# Patient Record
Sex: FEMALE | Race: BLACK OR AFRICAN AMERICAN | Employment: UNEMPLOYED | URBAN - METROPOLITAN AREA
[De-identification: names, ages, dates, MRNs, and addresses within clinical notes are randomized per-mention and may not be internally consistent; named-entity substitution may affect disease eponyms.]

---

## 2020-11-02 ENCOUNTER — HOSPITAL ENCOUNTER (EMERGENCY)
Age: 35
Discharge: HOME | End: 2020-11-02
Attending: FAMILY MEDICINE
Payer: MEDICAID

## 2020-11-02 VITALS
HEART RATE: 82 BPM | TEMPERATURE: 99.1 F | DIASTOLIC BLOOD PRESSURE: 86 MMHG | BODY MASS INDEX: 39.24 KG/M2 | RESPIRATION RATE: 18 BRPM | WEIGHT: 250 LBS | OXYGEN SATURATION: 100 % | HEIGHT: 67 IN | SYSTOLIC BLOOD PRESSURE: 139 MMHG

## 2020-11-02 DIAGNOSIS — R05.9 COUGH: Primary | ICD-10-CM

## 2020-11-02 PROCEDURE — 99281 EMR DPT VST MAYX REQ PHY/QHP: CPT

## 2020-11-02 RX ORDER — CETIRIZINE HYDROCHLORIDE 5 MG/1
10 TABLET ORAL DAILY
Qty: 60 TAB | Refills: 0 | Status: SHIPPED | OUTPATIENT
Start: 2020-11-02 | End: 2020-12-02

## 2020-11-02 RX ORDER — BENZONATATE 100 MG/1
100 CAPSULE ORAL
Qty: 20 CAP | Refills: 0 | Status: SHIPPED | OUTPATIENT
Start: 2020-11-02 | End: 2020-11-09

## 2020-11-02 NOTE — ED PROVIDER NOTES
HPI   Chief Complaint   Patient presents with    Cough   35yoF presents with cough. 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 She denies fever, rhinorrhea, sore throat, headache, myalgia, n/v/d, abdominal pain, known sick contacts. Has not had flu shot this year. Works at [employer] where she and everyone else practices good covid-19 precautions, and no worker has fallen sick from [facility]. Pt does smoke daily and notices whenever weather gets cold she begins to have regular dry coughs. Past Medical History:   Diagnosis Date    Anxiety     Depression      History reviewed. No pertinent surgical history.       Family History:   Problem Relation Age of Onset    Diabetes Mother     Hypertension Father    Ángel Donohue Elevated Lipids Father     Mental Retardation Brother        Social History     Socioeconomic History    Marital status:      Spouse name: Not on file    Number of children: Not on file    Years of education: Not on file    Highest education level: Not on file   Occupational History    Not on file   Social Needs    Financial resource strain: Not on file    Food insecurity     Worry: Not on file     Inability: Not on file    Transportation needs     Medical: Not on file     Non-medical: Not on file   Tobacco Use    Smoking status: Current Every Day Smoker     Packs/day: 0.50     Last attempt to quit: 10/22/2009     Years since quittin.0    Smokeless tobacco: Never Used   Substance and Sexual Activity    Alcohol use: No    Drug use: Yes     Types: Marijuana     Comment: occasional use    Sexual activity: Yes     Partners: Male     Birth control/protection: Condom, Pill   Lifestyle    Physical activity     Days per week: Not on file     Minutes per session: Not on file    Stress: Not on file   Relationships    Social connections     Talks on phone: Not on file Gets together: Not on file     Attends Jainism service: Not on file     Active member of club or organization: Not on file     Attends meetings of clubs or organizations: Not on file     Relationship status: Not on file    Intimate partner violence     Fear of current or ex partner: Not on file     Emotionally abused: Not on file     Physically abused: Not on file     Forced sexual activity: Not on file   Other Topics Concern    Not on file   Social History Narrative    Not on file         ALLERGIES: Latex, natural rubber; Shellfish containing products; and Tramadol    Review of Systems   Respiratory: Positive for cough, chest tightness, shortness of breath and wheezing. All other systems reviewed and are negative. Vitals:    11/02/20 0640   BP: 139/86   Pulse: 82   Resp: 18   Temp: 99.1 °F (37.3 °C)   SpO2: 100%   Weight: 113.4 kg (250 lb)   Height: 5' 7\" (1.702 m)          Physical Exam   Patient Vitals for the past 8 hrs:   Temp Pulse Resp BP SpO2   11/02/20 0640 99.1 °F (37.3 °C) 82 18 139/86 100 %        Nursing note and vitals reviewed. Constitutional: NAD. Head: Normocephalic and atraumatic. Mouth/Throat: Airway patent. Moist mucous membranes. Eyes: EOMI. No scleral icterus. Neck: Neck supple. Cardiovascular: Normal rate, regular rhythm. Normal heart sounds. No murmur, rub, or gallop. Good pulses throughout. Pulmonary/Chest: No respiratory distress. Not coughing. Clear to auscultation bilaterally. No wheezes, rales, or rhonchi. Abdominal/GI: BS normal, Soft, non-tender, non-distended. No rebound or guarding. Musculoskeletal: No gross injuries or deformities. No leg edema. Neurological: Alert and oriented to person, place, and time. Moving all extremities. No gross deficits. Psych: Pleasant, cooperative. Skin: Skin is warm and dry. No rash noted. MDM   Ddx = seasonal allergy, smoker's cough, less likely flu/covid-19/other viral infection, less likely pneumonia. PERC 0.   I briefly discussed flu vaccination and smoking cessation with patient. Rx cetirizine and tessalon. Given return precautions. Dajuan Smith MD, am  the first and primary ED provider for this patient.         Procedures

## 2020-11-02 NOTE — DISCHARGE INSTRUCTIONS
See PCP or return to ED if having infectious symptoms and requiring testing for flu and covid-19. Return to ED for trouble breathing. Cutting back and quitting cigarettes will also help.

## 2021-12-02 ENCOUNTER — HOSPITAL ENCOUNTER (EMERGENCY)
Age: 36
Discharge: HOME | End: 2021-12-02
Payer: MEDICAID

## 2021-12-02 ENCOUNTER — APPOINTMENT (OUTPATIENT)
Dept: CT IMAGING | Age: 36
End: 2021-12-02
Attending: EMERGENCY MEDICINE
Payer: MEDICAID

## 2021-12-02 VITALS
TEMPERATURE: 98 F | RESPIRATION RATE: 16 BRPM | DIASTOLIC BLOOD PRESSURE: 73 MMHG | HEIGHT: 67 IN | SYSTOLIC BLOOD PRESSURE: 121 MMHG | OXYGEN SATURATION: 99 % | BODY MASS INDEX: 37.67 KG/M2 | WEIGHT: 240 LBS | HEART RATE: 63 BPM

## 2021-12-02 DIAGNOSIS — R51.9 NONINTRACTABLE EPISODIC HEADACHE, UNSPECIFIED HEADACHE TYPE: Primary | ICD-10-CM

## 2021-12-02 PROCEDURE — 99283 EMERGENCY DEPT VISIT LOW MDM: CPT

## 2021-12-02 PROCEDURE — 96372 THER/PROPH/DIAG INJ SC/IM: CPT

## 2021-12-02 PROCEDURE — 74011636637 HC RX REV CODE- 636/637: Performed by: NURSE PRACTITIONER

## 2021-12-02 PROCEDURE — 70450 CT HEAD/BRAIN W/O DYE: CPT

## 2021-12-02 RX ORDER — SUMATRIPTAN 25 MG/1
25-100 TABLET, FILM COATED ORAL
Qty: 20 TABLET | Refills: 0 | Status: SHIPPED | OUTPATIENT
Start: 2021-12-02 | End: 2021-12-02

## 2021-12-02 RX ORDER — SUMATRIPTAN 6 MG/.5ML
6 INJECTION, SOLUTION SUBCUTANEOUS
Status: COMPLETED | OUTPATIENT
Start: 2021-12-02 | End: 2021-12-02

## 2021-12-02 RX ADMIN — SUMATRIPTAN 6 MG: 6 INJECTION, SOLUTION SUBCUTANEOUS at 19:44

## 2021-12-02 NOTE — ED NOTES
Pt presented to the ER with complaint of headaches. Pt stated she has been having headaches for about one year. Pt stated she has not seen anybody about headaches. Pt stated over the last couple of months headaches have been getting worse. Pt stated some of the headaches she has had nausea, sensistivity to light. Pt stated she has had some blurred vision. oriented to room and call bell,, cardiac monitoring initiated , spO2 Monitoring initiated , IV  initiated , call bell within reach, side rails up x2, resting comfortable but easily arousable, no signs of acute distress. , bed in lowest position, will continue to monitor      Airway: Patent, Managing oral secretions and No obstruction    Respiratory: Normal Rate , Normal Depth, No acute Distress and Speaking in full sentences    Cardiac: WNL    Neuro: AVPU alert and A&O4/4    GI: Soft and Non-tender    : WNL    Muscle/Skeletal/Skin: WNL

## 2021-12-02 NOTE — ED TRIAGE NOTES
Reports severe headache going on for \"awhile\" Reports taking meds with minimal improvement. Reports nausea and vomiting related to headache. No hx of migraine. Reports worse headache of life with some vision changes.

## 2021-12-02 NOTE — Clinical Note
[address] [facility] EMERGENCY DEPT  [address] [place] [id number]  [phone]    Work/School Note    Date: 12/2/2021    To Whom It May concern:    Lauren Ervin was seen and treated today in the emergency room by the following provider(s):  Nurse Practitioner: Iqra Ríos NP. Lauren Ervin is excused from work/school on 12/2/2021 through 12/4/2021. She is medically clear to return to work/school on 12/5/2021.          Sincerely,          Jackson Mcadams NP [facility]

## 2021-12-03 NOTE — DISCHARGE INSTRUCTIONS
Thank you! Thank you for allowing me to care for you in the emergency department. I sincerely hope that you are satisfied with your visit today. It is my goal to provide you with excellent care. Below you will find a list of your labs and imaging from your visit today. Should you have any questions regarding these results please do not hesitate to call the emergency department. Labs -   No results found for this or any previous visit (from the past 12 hour(s)). Radiologic Studies -   CT HEAD WO CONT   Final Result   Normal noncontrast head CT. CT Results  (Last 48 hours)                 12/02/21 1752  CT HEAD WO CONT Final result    Impression:  Normal noncontrast head CT. Narrative:  CT HEAD WITHOUT IV CONTRAST       CLINICAL INDICATION: Headache       TECHNIQUE: Routine axial images were obtained through the brain without the use   of IV contrast. Sagittal and coronal reformatted images were performed at the CT   console. Dose reduction: Per department policy, all CT scans at this facility   are performed using dose reduction optimization techniques as appropriate to a   performed examination including the following: Automated exposure control,   adjustments of the mA and/or KV according to patient size, or use of iterative   reconstruction technique. COMPARISON: None. FINDINGS:        No evidence of acute intracranial hemorrhage or mass effect. Brain parenchymal attenuation is unremarkable for patient age. Ventricles and extra-axial spaces are normal in size and configuration for age. Portions of the posterior fossa not obscured by streak artifact are   unremarkable. Globes and orbits are normal in CT appearance. Paranasal sinuses are predominantly clear. Tympanomastoid cavities are clear. No acute calvarial abnormality. Visualized major intracranial vasculature is unremarkable in noncontrast CT   appearance.                  CXR Results  (Last 48 hours) None               If you feel that you have not received excellent quality care or timely care, please ask to speak to the nurse manager. Please choose us in the future for your continued health care needs. ------------------------------------------------------------------------------------------------------------  The exam and treatment you received in the Emergency Department were for an urgent problem and are not intended as complete care. It is important that you follow-up with a doctor, nurse practitioner, or physician assistant to:  (1) confirm your diagnosis,  (2) re-evaluation of changes in your illness and treatment, and  (3) for ongoing care. If your symptoms become worse or you do not improve as expected and you are unable to reach your usual health care provider, you should return to the Emergency Department. We are available 24 hours a day. Please take your discharge instructions with you when you go to your follow-up appointment. If you have any problem arranging a follow-up appointment, contact the Emergency Department immediately. If a prescription has been provided, please have it filled as soon as possible to prevent a delay in treatment. Read the entire medication instruction sheet provided to you by the pharmacy. If you have any questions or reservations about taking the medication due to side effects or interactions with other medications, please call your primary care physician or contact the ER to speak with the charge nurse. Make an appointment with your family doctor or the physician you were referred to for follow-up of this visit as instructed on your discharge paperwork, as this is a mandatory follow-up. Return to the ER if you are unable to be seen or if you are unable to be seen in a timely manner. If you have any problem arranging the follow-up visit, contact the Emergency Department immediately. no

## 2021-12-06 NOTE — ED PROVIDER NOTES
EMERGENCY DEPARTMENT HISTORY AND PHYSICAL EXAM      Date: 2021  Patient Name: Paula Knowles    History of Presenting Illness     Chief Complaint   Patient presents with    Headache       History Provided By: Patient    HPI: Paula Knowles, 31 y.o. female with a past medical history significant anxiety presents to the ED with cc of headache. Patient reports chronic headaches x1 year. She states headaches have become more persistent over the last few weeks. She denies any associated visual changes, photophobia, dizziness or N/V. She states she typically takes ibuprofen and rest with resolution, tonight her symptoms did not resolve. She states she is mostly worried because her son had similar symptoms and found to have a brain tumor. There are no other complaints, changes, or physical findings at this time. PCP: None    No current facility-administered medications on file prior to encounter. No current outpatient medications on file prior to encounter. Past History     Past Medical History:  Past Medical History:   Diagnosis Date    Anxiety     Depression        Past Surgical History:  No past surgical history on file. Family History:  Family History   Problem Relation Age of Onset    Diabetes Mother     Hypertension Father     Elevated Lipids Father     Mental Retardation Brother        Social History:  Social History     Tobacco Use    Smoking status: Current Every Day Smoker     Packs/day: 0.50     Last attempt to quit: 10/22/2009     Years since quittin.1    Smokeless tobacco: Never Used   Substance Use Topics    Alcohol use: No    Drug use: Yes     Types: Marijuana     Comment: occasional use       Allergies: Allergies   Allergen Reactions    Latex, Natural Rubber Rash and Swelling    Shellfish Containing Products Itching    Tramadol Hives         Review of Systems     Review of Systems   HENT: Negative. Eyes: Negative for photophobia and visual disturbance. Gastrointestinal: Negative. Negative for nausea and vomiting. Neurological: Positive for headaches. Negative for dizziness. All other systems reviewed and are negative. Physical Exam     Physical Exam  Vitals and nursing note reviewed. Constitutional:       General: She is not in acute distress. Appearance: Normal appearance. She is obese. She is not toxic-appearing. HENT:      Head: Normocephalic and atraumatic. Right Ear: Tympanic membrane normal.      Left Ear: Tympanic membrane normal.      Nose: Nose normal.      Mouth/Throat:      Mouth: Mucous membranes are moist.   Eyes:      Extraocular Movements: Extraocular movements intact. Conjunctiva/sclera: Conjunctivae normal.      Pupils: Pupils are equal, round, and reactive to light. Cardiovascular:      Rate and Rhythm: Normal rate and regular rhythm. Heart sounds: Normal heart sounds. Pulmonary:      Effort: Pulmonary effort is normal.      Breath sounds: Normal breath sounds. No wheezing or rales. Musculoskeletal:         General: Normal range of motion. Skin:     General: Skin is warm and dry. Neurological:      General: No focal deficit present. Mental Status: She is alert. GCS: GCS eye subscore is 4. GCS verbal subscore is 5. GCS motor subscore is 6. Cranial Nerves: Cranial nerves are intact. Sensory: Sensation is intact. Motor: Motor function is intact. Coordination: Coordination is intact. Gait: Gait is intact. Psychiatric:         Mood and Affect: Mood normal.         Behavior: Behavior normal. Behavior is cooperative. Lab and Diagnostic Study Results     Labs -   No results found for this or any previous visit (from the past 12 hour(s)).     Radiologic Studies -   CT Results (most recent):  Results from Hospital Encounter encounter on 12/02/21    CT HEAD WO CONT    Narrative  CT HEAD WITHOUT IV CONTRAST    CLINICAL INDICATION: Headache    TECHNIQUE: Routine axial images were obtained through the brain without the use  of IV contrast. Sagittal and coronal reformatted images were performed at the CT  console. Dose reduction: Per department policy, all CT scans at this facility  are performed using dose reduction optimization techniques as appropriate to a  performed examination including the following: Automated exposure control,  adjustments of the mA and/or KV according to patient size, or use of iterative  reconstruction technique. COMPARISON: None. FINDINGS:    No evidence of acute intracranial hemorrhage or mass effect. Brain parenchymal attenuation is unremarkable for patient age. Ventricles and extra-axial spaces are normal in size and configuration for age. Portions of the posterior fossa not obscured by streak artifact are  unremarkable. Globes and orbits are normal in CT appearance. Paranasal sinuses are predominantly clear. Tympanomastoid cavities are clear. No acute calvarial abnormality. Visualized major intracranial vasculature is unremarkable in noncontrast CT  appearance. Impression  Normal noncontrast head CT. Medical Decision Making   - I am the first provider for this patient. - I reviewed the vital signs, available nursing notes, past medical history, past surgical history, family history and social history. - Initial assessment performed. The patients presenting problems have been discussed, and they are in agreement with the care plan formulated and outlined with them. I have encouraged them to ask questions as they arise throughout their visit. Vital Signs-Reviewed the patient's vital signs.   See chart    Records Reviewed: Nursing Notes    The patient presents with headache with a differential diagnosis of  ICH, migraine, sinusitis and tension headache      ED Course:     ED Course as of 12/06/21 0502   Thu Dec 02, 2021   2110 Pt states she is feeling better and ready to  go home [LP]   6790 Pt states HA has completely subsided [LP]      ED Course User Index  [LP] Rob Gonzalez NP       Provider Notes (Medical Decision Making):   Patient presents with acute on chronic aches, neuro benign. Vitals are normal.  Head CT negative. Patient was treated with subcu Imitrex with improvement of symptoms. Will discharge with Rx Imitrex as needed with PCP/neurology follow-up for persistent symptoms. Reinforced importance of adequate hydration and rest.  Red flags and return precautions reviewed  MDM  Number of Diagnoses or Management Options  Nonintractable episodic headache, unspecified headache type: established, worsening     Amount and/or Complexity of Data Reviewed  Tests in the radiology section of CPT®: ordered and reviewed    Risk of Complications, Morbidity, and/or Mortality  Presenting problems: low  Diagnostic procedures: low  Management options: low    Patient Progress  Patient progress: improved         Disposition   Disposition: Condition stable and improved  DC- Adult Discharges: All of the diagnostic tests were reviewed and questions answered. Diagnosis, care plan and treatment options were discussed. The patient understands the instructions and will follow up as directed. The patients results have been reviewed with them. They have been counseled regarding their diagnosis. The patient verbally convey understanding and agreement of the signs, symptoms, diagnosis, treatment and prognosis and additionally agrees to follow up as recommended with their PCP in 24 - 48 hours. They also agree with the care-plan and convey that all of their questions have been answered. I have also put together some discharge instructions for them that include: 1) educational information regarding their diagnosis, 2) how to care for their diagnosis at home, as well a 3) list of reasons why they would want to return to the ED prior to their follow-up appointment, should their condition change.   DC-The patient was given verbal follow-up and headache warning signs and instructions  DC- Pain Control DC Home plan: Nonsteroidals, Tylenol and Referral Family Medicine/PCP and neurology    Discharged    DISCHARGE PLAN:  1. Cannot display discharge medications since this patient is not currently admitted. 2.   Follow-up Information     Follow up With Specialties Details [facility] [address]  Schedule an appointment as soon as possible for a visit  OR your PCP, for ER follow up [address]    [address] [facility] Neurology Schedule an appointment as soon as possible for a visit in 1 day for persistent headaches follow up [facility] [address]  [phone]        3. Return to ED if worse   4. Discharge Medication List as of 12/2/2021  9:59 PM      imitrex 25mg PO      Diagnosis     Clinical Impression:   1. Nonintractable episodic headache, unspecified headache type        Attestations:    Duke Weber NP    Please note that this dictation was completed with [employer], the computer voice recognition software. Quite often unanticipated grammatical, syntax, homophones, and other interpretive errors are inadvertently transcribed by the computer software. Please disregard these errors. Please excuse any errors that have escaped final proofreading. Thank you.

## 2022-03-03 ENCOUNTER — HOSPITAL ENCOUNTER (EMERGENCY)
Age: 37
Discharge: HOME | End: 2022-03-03
Payer: MEDICAID

## 2022-03-03 ENCOUNTER — APPOINTMENT (OUTPATIENT)
Dept: GENERAL RADIOLOGY | Age: 37
End: 2022-03-03
Attending: STUDENT IN AN ORGANIZED HEALTH CARE EDUCATION/TRAINING PROGRAM
Payer: MEDICAID

## 2022-03-03 VITALS
DIASTOLIC BLOOD PRESSURE: 61 MMHG | BODY MASS INDEX: 37.98 KG/M2 | WEIGHT: 242 LBS | HEIGHT: 67 IN | TEMPERATURE: 98.3 F | RESPIRATION RATE: 16 BRPM | HEART RATE: 88 BPM | OXYGEN SATURATION: 100 % | SYSTOLIC BLOOD PRESSURE: 142 MMHG

## 2022-03-03 DIAGNOSIS — K29.90 GASTRITIS AND DUODENITIS: Primary | ICD-10-CM

## 2022-03-03 LAB
ALBUMIN SERPL-MCNC: 3.3 G/DL (ref 3.5–5)
ALBUMIN/GLOB SERPL: 0.7 {RATIO} (ref 1.1–2.2)
ALP SERPL-CCNC: 65 U/L (ref 45–117)
ALT SERPL-CCNC: 32 U/L (ref 12–78)
ANION GAP SERPL CALC-SCNC: 4 MMOL/L (ref 5–15)
AST SERPL W P-5'-P-CCNC: 24 U/L (ref 15–37)
BASOPHILS # BLD: 0 K/UL (ref 0–0.1)
BASOPHILS NFR BLD: 0 % (ref 0–1)
BILIRUB SERPL-MCNC: 0.2 MG/DL (ref 0.2–1)
BUN SERPL-MCNC: 12 MG/DL (ref 6–20)
BUN/CREAT SERPL: 16 (ref 12–20)
CA-I BLD-MCNC: 8.8 MG/DL (ref 8.5–10.1)
CHLORIDE SERPL-SCNC: 109 MMOL/L (ref 97–108)
CO2 SERPL-SCNC: 26 MMOL/L (ref 21–32)
CREAT SERPL-MCNC: 0.74 MG/DL (ref 0.55–1.02)
DIFFERENTIAL METHOD BLD: ABNORMAL
EOSINOPHIL # BLD: 0.2 K/UL (ref 0–0.4)
EOSINOPHIL NFR BLD: 1 % (ref 0–7)
ERYTHROCYTE [DISTWIDTH] IN BLOOD BY AUTOMATED COUNT: 13.1 % (ref 11.5–14.5)
GLOBULIN SER CALC-MCNC: 4.5 G/DL (ref 2–4)
GLUCOSE SERPL-MCNC: 92 MG/DL (ref 65–100)
HCT VFR BLD AUTO: 39.9 % (ref 35–47)
HGB BLD-MCNC: 13.2 G/DL (ref 11.5–16)
IMM GRANULOCYTES # BLD AUTO: 0 K/UL (ref 0–0.04)
IMM GRANULOCYTES NFR BLD AUTO: 0 % (ref 0–0.5)
LYMPHOCYTES # BLD: 3.3 K/UL (ref 0.8–3.5)
LYMPHOCYTES NFR BLD: 27 % (ref 12–49)
MCH RBC QN AUTO: 28.3 PG (ref 26–34)
MCHC RBC AUTO-ENTMCNC: 33.1 G/DL (ref 30–36.5)
MCV RBC AUTO: 85.4 FL (ref 80–99)
MONOCYTES # BLD: 0.8 K/UL (ref 0–1)
MONOCYTES NFR BLD: 7 % (ref 5–13)
NEUTS SEG # BLD: 7.9 K/UL (ref 1.8–8)
NEUTS SEG NFR BLD: 65 % (ref 32–75)
NRBC # BLD: 0 K/UL (ref 0–0.01)
NRBC BLD-RTO: 0 PER 100 WBC
PLATELET # BLD AUTO: 262 K/UL (ref 150–400)
PMV BLD AUTO: 9.7 FL (ref 8.9–12.9)
POTASSIUM SERPL-SCNC: 4.2 MMOL/L (ref 3.5–5.1)
PROT SERPL-MCNC: 7.8 G/DL (ref 6.4–8.2)
RBC # BLD AUTO: 4.67 M/UL (ref 3.8–5.2)
SODIUM SERPL-SCNC: 139 MMOL/L (ref 136–145)
TROPONIN-HIGH SENSITIVITY: 4 NG/L (ref 0–51)
WBC # BLD AUTO: 12.2 K/UL (ref 3.6–11)

## 2022-03-03 PROCEDURE — 85025 COMPLETE CBC W/AUTO DIFF WBC: CPT

## 2022-03-03 PROCEDURE — 74011250637 HC RX REV CODE- 250/637: Performed by: PHYSICIAN ASSISTANT

## 2022-03-03 PROCEDURE — 84484 ASSAY OF TROPONIN QUANT: CPT

## 2022-03-03 PROCEDURE — 71045 X-RAY EXAM CHEST 1 VIEW: CPT

## 2022-03-03 PROCEDURE — 80053 COMPREHEN METABOLIC PANEL: CPT

## 2022-03-03 PROCEDURE — 74011000250 HC RX REV CODE- 250: Performed by: PHYSICIAN ASSISTANT

## 2022-03-03 PROCEDURE — 93005 ELECTROCARDIOGRAM TRACING: CPT

## 2022-03-03 PROCEDURE — 99285 EMERGENCY DEPT VISIT HI MDM: CPT

## 2022-03-03 PROCEDURE — 96374 THER/PROPH/DIAG INJ IV PUSH: CPT

## 2022-03-03 PROCEDURE — 36415 COLL VENOUS BLD VENIPUNCTURE: CPT

## 2022-03-03 PROCEDURE — 74011250636 HC RX REV CODE- 250/636: Performed by: PHYSICIAN ASSISTANT

## 2022-03-03 RX ORDER — FAMOTIDINE 20 MG/1
20 TABLET, FILM COATED ORAL 2 TIMES DAILY
Qty: 20 TABLET | Refills: 0 | Status: SHIPPED | OUTPATIENT
Start: 2022-03-03 | End: 2022-03-13

## 2022-03-03 RX ORDER — LIDOCAINE HYDROCHLORIDE 20 MG/ML
15 SOLUTION OROPHARYNGEAL
Status: COMPLETED | OUTPATIENT
Start: 2022-03-03 | End: 2022-03-03

## 2022-03-03 RX ORDER — MAG HYDROX/ALUMINUM HYD/SIMETH 200-200-20
30 SUSPENSION, ORAL (FINAL DOSE FORM) ORAL ONCE
Status: COMPLETED | OUTPATIENT
Start: 2022-03-03 | End: 2022-03-03

## 2022-03-03 RX ADMIN — ALUMINUM HYDROXIDE, MAGNESIUM HYDROXIDE, AND SIMETHICONE 30 ML: 200; 200; 20 SUSPENSION ORAL at 22:21

## 2022-03-03 RX ADMIN — SODIUM CHLORIDE, PRESERVATIVE FREE 20 MG: 5 INJECTION INTRAVENOUS at 22:22

## 2022-03-03 RX ADMIN — LIDOCAINE HYDROCHLORIDE 15 ML: 20 SOLUTION ORAL; TOPICAL at 22:21

## 2022-03-03 NOTE — Clinical Note
[facility] EMERGENCY DEPT  [place] [id number]  [phone]    Work/School Note    Date: 3/3/2022    To Whom It May concern:    Shelbi Horner was seen and treated today in the emergency room by the following provider(s):  Physician Assistant: Bessie Giordano PA-C. Shelbi Horner is excused from work/school on 03/03/22 and 03/04/22. She is medically clear to return to work/school on 3/5/2022.        Sincerely,          Trace Cleveland PA-C

## 2022-03-04 LAB
ATRIAL RATE: 82 BPM
CALCULATED P AXIS, ECG09: 59 DEGREES
CALCULATED R AXIS, ECG10: 39 DEGREES
CALCULATED T AXIS, ECG11: 31 DEGREES
DIAGNOSIS, 93000: NORMAL
P-R INTERVAL, ECG05: 164 MS
Q-T INTERVAL, ECG07: 362 MS
QRS DURATION, ECG06: 82 MS
QTC CALCULATION (BEZET), ECG08: 422 MS
VENTRICULAR RATE, ECG03: 82 BPM

## 2022-03-04 NOTE — ED PROVIDER NOTES
EMERGENCY DEPARTMENT HISTORY AND PHYSICAL EXAM      Date: 3/3/2022  Patient Name: Patricia Goode    History of Presenting Illness     Chief Complaint   Patient presents with    Chest Pain       History Provided By: Patient    HPI: Patricia Goode, 30 y.o. female with a past medical history significant for anxiety and depression who presents to the ED with cc of epigastric abdominal pain. Pt reports a one day history of \"burning\" epigastric pain radiating into her chest as a \"tightness\". Pt states that her sx's are consistent with prior episodes of \"indigestion\". She tried treating with tums with no relief. Pt denies being followed by GI. She has no further concerns and states that she is otherwise well. Pt specifically denies any fever, chills, cough, congestion, sore throat, shortness of breath, nausea, vomit, diarrhea, changes in bowel or bladder habits, headaches, light headedness, dizziness, diaphoresis, or rash. There are no other complaints, changes, or physical findings at this time. PCP: None    No current facility-administered medications on file prior to encounter. No current outpatient medications on file prior to encounter. Past History     Past Medical History:  Past Medical History:   Diagnosis Date    Anxiety     Depression        Past Surgical History:  No past surgical history on file. Family History:  Family History   Problem Relation Age of Onset    Diabetes Mother     Hypertension Father     Elevated Lipids Father     Mental Retardation Brother        Social History:  Social History     Tobacco Use    Smoking status: Current Every Day Smoker     Packs/day: 0.50     Last attempt to quit: 10/22/2009     Years since quittin.3    Smokeless tobacco: Never Used   Substance Use Topics    Alcohol use: No    Drug use: Yes     Types: Marijuana     Comment: occasional use       Allergies:   Allergies   Allergen Reactions    Latex, Natural Rubber Rash and Swelling    Iodine Itching    Shellfish Containing Products Itching    Tramadol Hives         Review of Systems     Review of Systems   Constitutional: Negative. Negative for chills, diaphoresis and fever. HENT: Negative. Negative for congestion, rhinorrhea and sore throat. Eyes: Negative. Respiratory: Negative. Negative for cough, chest tightness, shortness of breath and wheezing. Cardiovascular: Negative. Negative for chest pain and palpitations. Gastrointestinal: Positive for abdominal pain. Negative for diarrhea, nausea and vomiting. Genitourinary: Negative. Negative for difficulty urinating, dysuria, flank pain, frequency and hematuria. Musculoskeletal: Negative. Skin: Negative. Negative for rash. Neurological: Negative. Negative for dizziness, syncope, weakness, light-headedness, numbness and headaches. Psychiatric/Behavioral: Negative. All other systems reviewed and are negative. Physical Exam     Physical Exam  Vitals and nursing note reviewed. Constitutional:       General: She is not in acute distress. Appearance: Normal appearance. She is not ill-appearing or toxic-appearing. HENT:      Head: Normocephalic and atraumatic. Mouth/Throat:      Mouth: Mucous membranes are moist.      Pharynx: Oropharynx is clear. Eyes:      Extraocular Movements: Extraocular movements intact. Conjunctiva/sclera: Conjunctivae normal.      Pupils: Pupils are equal, round, and reactive to light. Cardiovascular:      Rate and Rhythm: Normal rate and regular rhythm. Pulses: Normal pulses. Heart sounds: Normal heart sounds. No murmur heard. No friction rub. No gallop. Pulmonary:      Effort: Pulmonary effort is normal.      Breath sounds: Normal breath sounds. No wheezing, rhonchi or rales. Abdominal:      General: Bowel sounds are normal. There is no distension. Palpations: Abdomen is soft. Tenderness:  There is abdominal tenderness (minimal) in the epigastric area. There is no guarding or rebound. Musculoskeletal:      Cervical back: Neck supple. No tenderness. Right lower leg: No edema. Left lower leg: No edema. Skin:     General: Skin is warm and dry. Capillary Refill: Capillary refill takes less than 2 seconds. Findings: No rash. Neurological:      General: No focal deficit present. Mental Status: She is alert and oriented to person, place, and time. Sensory: Sensation is intact. Motor: Motor function is intact. Gait: Gait is intact. Psychiatric:         Mood and Affect: Mood normal.         Behavior: Behavior normal.         Lab and Diagnostic Study Results     Labs -     No results found for this or any previous visit (from the past 12 hour(s)). Radiologic Studies -   @lastxrresult@  CT Results  (Last 48 hours)    None        CXR Results  (Last 48 hours)    None            Medical Decision Making   - I am the first provider for this patient. - I reviewed the vital signs, available nursing notes, past medical history, past surgical history, family history and social history. - Initial assessment performed. The patients presenting problems have been discussed, and they are in agreement with the care plan formulated and outlined with them. I have encouraged them to ask questions as they arise throughout their visit. Vital Signs-Reviewed the patient's vital signs. No data found. Records Reviewed: Nursing Notes and Old Medical Records         Provider Notes (Medical Decision Making):     MDM  Number of Diagnoses or Management Options  Gastritis and duodenitis  Diagnosis management comments: Pt presents with acute abdominal pain; vital signs stable with currently a non-peritoneal exam; DDx includes: Gastroenteritis, hepatitis, pancreatitis, obstruction, appendicitis, viral illness, IBD, diverticulitis, mesenteric ischemia, AAA or descending dissection, ACS, kidney stone.  Will get labs, treat symptomatically and obtain serial abdominal exams to determine if additional imaging is indicated. Will reassess and monitor closely. Amount and/or Complexity of Data Reviewed  Clinical lab tests: ordered and reviewed  Tests in the radiology section of CPT®: ordered and reviewed  Tests in the medicine section of CPT®: ordered and reviewed  Review and summarize past medical records: yes  Independent visualization of images, tracings, or specimens: yes         ED Course:   11:00 PM  Patient reassessed reports that her pain significantly improved following GI cocktail. He has been updated on all results and findings and has no additional complaints or concerns. Patient is requesting to be discharged home. She has been educated on strict return precautions conveys good understanding. Patient was provided referral to GI specialist advised close follow-up with patient persist, return to ED sooner if worse. All questions have been answered. Dissipate discharge home shortly. Procedures   Medical Decision Makingedical Decision Making  Performed by: Reba Essex, PA-C  PROCEDURES:  Procedures       Disposition   Disposition: DC- Adult Discharges: All of the diagnostic tests were reviewed and questions answered. Diagnosis, care plan and treatment options were discussed. The patient understands the instructions and will follow up as directed. The patients results have been reviewed with them. They have been counseled regarding their diagnosis. The patient verbally convey understanding and agreement of the signs, symptoms, diagnosis, treatment and prognosis and additionally agrees to follow up as recommended with their PCP in 24 - 48 hours. They also agree with the care-plan and convey that all of their questions have been answered.   I have also put together some discharge instructions for them that include: 1) educational information regarding their diagnosis, 2) how to care for their diagnosis at home, as well a 3) list of reasons why they would want to return to the ED prior to their follow-up appointment, should their condition change. Discharged    DISCHARGE PLAN:  1. Current Discharge Medication List      START taking these medications    Details   famotidine (Pepcid) 20 mg tablet Take 1 Tablet by mouth two (2) times a day for 10 days. Qty: 20 Tablet, Refills: 0           2. Follow-up Information     Follow up With Specialties Details Why Contact Info    Jojo Choudhary MD Gastroenterology Schedule an appointment as soon as possible for a visit   [address] [place] [address] 21       [address] [facility]   As needed [address] [phone]        3. Return to ED if worse   4. Discharge Medication List as of 3/3/2022 11:15 PM            Diagnosis     Clinical Impression:   1. Gastritis and duodenitis        Attestations:    Linzie Nageotte Pidcoe, PA-C    Please note that this dictation was completed with [employer], the computer voice recognition software. Quite often unanticipated grammatical, syntax, homophones, and other interpretive errors are inadvertently transcribed by the computer software. Please disregard these errors. Please excuse any errors that have escaped final proofreading. Thank you.

## 2023-03-01 ENCOUNTER — OFFICE VISIT (OUTPATIENT)
Dept: OBGYN CLINIC | Age: 38
End: 2023-03-01
Payer: MEDICAID

## 2023-03-01 VITALS
HEIGHT: 64 IN | BODY MASS INDEX: 47.87 KG/M2 | DIASTOLIC BLOOD PRESSURE: 74 MMHG | WEIGHT: 280.38 LBS | SYSTOLIC BLOOD PRESSURE: 116 MMHG

## 2023-03-01 DIAGNOSIS — Z01.419 ROUTINE GYNECOLOGICAL EXAMINATION: ICD-10-CM

## 2023-03-01 DIAGNOSIS — Z12.4 SCREENING FOR MALIGNANT NEOPLASM OF CERVIX: Primary | ICD-10-CM

## 2023-03-01 DIAGNOSIS — Z97.5 IUD (INTRAUTERINE DEVICE) IN PLACE: ICD-10-CM

## 2023-03-01 DIAGNOSIS — Z87.42 HISTORY OF ABNORMAL CERVICAL PAP SMEAR: ICD-10-CM

## 2023-03-01 PROBLEM — G47.00 INSOMNIA: Status: ACTIVE | Noted: 2023-03-01

## 2023-03-01 PROBLEM — K21.00 GERD WITH ESOPHAGITIS: Status: ACTIVE | Noted: 2023-03-01

## 2023-03-01 PROBLEM — K44.9 HIATAL HERNIA: Status: ACTIVE | Noted: 2023-03-01

## 2023-03-01 PROBLEM — E66.01 MORBID OBESITY (HCC): Status: ACTIVE | Noted: 2023-03-01

## 2023-03-01 PROBLEM — R73.03 PRE-DIABETES: Status: ACTIVE | Noted: 2023-03-01

## 2023-03-01 PROCEDURE — 99395 PREV VISIT EST AGE 18-39: CPT | Performed by: OBSTETRICS & GYNECOLOGY

## 2023-03-01 RX ORDER — UREA 10 %
LOTION (ML) TOPICAL
COMMUNITY
Start: 2023-02-02

## 2023-03-01 RX ORDER — ASPIRIN 325 MG
TABLET, DELAYED RELEASE (ENTERIC COATED) ORAL
COMMUNITY
Start: 2023-02-28

## 2023-03-01 RX ORDER — OMEPRAZOLE 20 MG/1
CAPSULE, DELAYED RELEASE ORAL
COMMUNITY
Start: 2023-02-28

## 2023-03-01 RX ORDER — BIOTIN 5 MG
CAPSULE ORAL
COMMUNITY
End: 2023-03-01

## 2023-03-01 RX ORDER — LANOLIN ALCOHOL/MO/W.PET/CERES
CREAM (GRAM) TOPICAL
COMMUNITY
Start: 2023-03-01

## 2023-03-01 RX ORDER — MULTIVITAMIN
CAPSULE ORAL
COMMUNITY
Start: 2023-02-02

## 2023-03-01 NOTE — PROGRESS NOTES
Chief Complaint   Patient presents with    New Patient     Annual exam, Mirena consent obtained     Visit Vitals  /74 (BP 1 Location: Left upper arm, BP Patient Position: Sitting, BP Cuff Size: Large adult)   Ht 5' 4\" (1.626 m)   Wt 280 lb 6 oz (127.2 kg)   LMP  (LMP Unknown)   BMI 48.13 kg/m²

## 2023-03-01 NOTE — PROGRESS NOTES
Pete Chowdhury is a , 41 y.o. female   No LMP recorded (lmp unknown). (Menstrual status: IUD). She presents for her annual    She is having no significant problems. Menstrual status:  Cycles are minimal to none with hormone containing IUS. Flow: absent. She does not have dysmenorrhea. Medical conditions:  Since her last annual GYN exam about one year ago, she has not the following changes in her health history: none. Mammogram History:    YANICK Results (most recent):  No results found for this or any previous visit. DEXA Results (most recent):  No results found for this or any previous visit. Past Medical History:   Diagnosis Date    Anxiety     Depression     GERD with esophagitis     Hiatal hernia     History of abnormal cervical Pap smear     Insomnia     Morbid obesity ([address]     Pre-diabetes      Past Surgical History:   Procedure Laterality Date    COLPOSCOPY      HX GI  2023    endoscopy    HX GI      cholecystectomy    HX HERNIA REPAIR      HX WISDOM TEETH EXTRACTION         Prior to Admission medications    Medication Sig Start Date End Date Taking? Authorizing Provider   ascorbic acid (VITAMIN C) 500 mg chewable tablet  23  Yes Provider, Historical   cholecalciferol (VITAMIN D3) (50,000 UNITS /1250 MCG) capsule  23  Yes Provider, Historical   cyanocobalamin 1,000 mcg tablet  3/1/23  Yes Provider, Historical   multivitamin capsule  23  Yes Provider, Historical   omeprazole (PRILOSEC) 20 mg capsule  23  Yes Provider, Historical       Allergies   Allergen Reactions    Latex Rash    Latex, Natural Rubber Rash and Swelling    Iodine Itching    Shellfish Containing Products Itching    Tramadol Hives          Tobacco History:  reports that she quit smoking about 13 years ago. Her smoking use included cigarettes. She smoked an average of .5 packs per day.  She has never used smokeless tobacco.  Alcohol use:  reports no history of alcohol use.  Drug use:  reports current drug use. Drug: Marijuana. Family Medical/Cancer History:   Family History   Problem Relation Age of Onset    Diabetes Mother     Hypertension Father     Elevated Lipids Father     Mental Retardation Brother           Review of Systems   Constitutional:  Negative for chills, fever, malaise/fatigue and weight loss. HENT:  Negative for congestion, ear pain, sinus pain and tinnitus. Eyes:  Negative for blurred vision and double vision. Respiratory:  Negative for cough, shortness of breath and wheezing. Cardiovascular:  Negative for chest pain and palpitations. Gastrointestinal:  Negative for abdominal pain, blood in stool, constipation, diarrhea, heartburn, nausea and vomiting. Genitourinary:  Negative for dysuria, flank pain, frequency, hematuria and urgency. Musculoskeletal:  Negative for joint pain and myalgias. Skin:  Negative for itching and rash. Neurological:  Negative for dizziness, weakness and headaches. Psychiatric/Behavioral:  Negative for depression, memory loss and suicidal ideas. The patient is not nervous/anxious and does not have insomnia. Physical Exam  Constitutional:       Appearance: Normal appearance. HENT:      Head: Normocephalic and atraumatic. Cardiovascular:      Rate and Rhythm: Normal rate. Heart sounds: Normal heart sounds. Pulmonary:      Effort: Pulmonary effort is normal.      Breath sounds: Normal breath sounds. Chest:   Breasts:     Right: Normal.      Left: Normal.   Abdominal:      General: Abdomen is flat. Palpations: Abdomen is soft. Genitourinary:     General: Normal vulva. Vagina: Normal.      Cervix: Normal.      Uterus: Normal.       Adnexa: Right adnexa normal and left adnexa normal.      Rectum: Normal.            Comments: PAP Obtained  IUD STRINGS NOT SEEN  Neurological:      Mental Status: She is alert.    Psychiatric:         Mood and Affect: Mood normal.         Behavior: Behavior normal.         Thought Content: Thought content normal.        Visit Vitals  /74 (BP 1 Location: Left upper arm, BP Patient Position: Sitting, BP Cuff Size: Large adult)   Ht 5' 4\" (1.626 m)   Wt 280 lb 6 oz (127.2 kg)   LMP  (LMP Unknown)   BMI 48.13 kg/m²         Assessment: Diagnoses and all orders for this visit:    1. Screening for malignant neoplasm of cervix  -     PAP IG, RFX APTIMA HPV ASCUS ([id number])    2. History of abnormal cervical Pap smear    3. Routine gynecological examination  -     PAP IG, RFX APTIMA HPV ASCUS ([id number])    4. BMI 45.0-49.9, adult ([facility]    5.  IUD (intrauterine device) in place  IUD consent signed    Plan: Questions addressed  Counseled re: diet, exercise, healthy lifestyle  Return for Annual  Rec annual mammogram        Follow-up and Dispositions    Return for 1 yr annual.

## 2023-03-03 LAB
CYTOLOGIST CVX/VAG CYTO: NORMAL
CYTOLOGY CVX/VAG DOC CYTO: NORMAL
CYTOLOGY CVX/VAG DOC THIN PREP: NORMAL
DX ICD CODE: NORMAL
LABCORP, 190119: NORMAL
Lab: NORMAL
OTHER STN SPEC: NORMAL
STAT OF ADQ CVX/VAG CYTO-IMP: NORMAL

## 2023-03-22 ENCOUNTER — OFFICE VISIT (OUTPATIENT)
Dept: OBGYN CLINIC | Age: 38
End: 2023-03-22
Payer: MEDICAID

## 2023-03-22 VITALS — BODY MASS INDEX: 47.8 KG/M2 | HEIGHT: 64 IN | WEIGHT: 280 LBS

## 2023-03-22 DIAGNOSIS — Z30.433 ENCOUNTER FOR REMOVAL AND REINSERTION OF INTRAUTERINE CONTRACEPTIVE DEVICE (IUD): ICD-10-CM

## 2023-03-22 DIAGNOSIS — N94.89 MENSTRUAL SUPPRESSION: Primary | ICD-10-CM

## 2023-03-22 PROCEDURE — 58300 INSERT INTRAUTERINE DEVICE: CPT | Performed by: OBSTETRICS & GYNECOLOGY

## 2023-03-22 PROCEDURE — 58301 REMOVE INTRAUTERINE DEVICE: CPT | Performed by: OBSTETRICS & GYNECOLOGY

## 2023-03-22 NOTE — PROGRESS NOTES
IUD REPLACEMENT    Indications for Removal:  Tom Miller is a ,  41 y.o. female BLACK/ whose No LMP recorded (lmp unknown). (Menstrual status: IUD). was on . who presents today for IUD replacement. Her current IUD was placed 5 years ago. She has not had any problems with the IUD. She requests replacement of the IUD because the IUD effectiveness has . The IUD removal procedure was discussed with the patient and she had no further questions. Procedure: The patient was placed in a dorsal lithotomy position and appropriately draped. A speculum exam was performed and the cervix was visualized. The cervix was prepped with zephiran solution. The IUD string were not visualized. Using alligator forceps , the string was grasped and the IUD removed intact. The IUD was shown to the patient. IUD INSERTION  Indications: The risks, benefits and alternatives of IUD insertion were discussed in detail. She also has reviewed Mirena information. She has elected to proceed with the insertion today and she states she has no further questions. A urine pregnancy test was negative today. Procedure: The pelvic exam revealed normal external genitalia. A speculum was inserted into the vagina and the cervix was visualized. The cervix was prepped with zephiran solution. The anterior lip of the cervix was grasped with a single toothed tenaculum. The uterus was sounded with a Segal sound to 8 centimeters. A Mirena was then inserted without difficulty. The string was cut to 3 centimeters. She experienced a moderate amount of cramping. Post Procedure Status: She tolerated the procedure well. Diagnoses and all orders for this visit:    1. Menstrual suppression  -     levonorgestreL (MIRENA) 21 mcg/24 hours (8 yrs) 52 mg IUD 20 mcg    2.  Encounter for removal and reinsertion of intrauterine contraceptive device (IUD)        Plan: Questions addressed  Counseled re: diet, exercise, healthy lifestyle        Follow-up and Dispositions    Return in about 4 weeks (around 4/19/2023) for Follow-up.

## 2023-03-22 NOTE — PROGRESS NOTES
Chief Complaint   Patient presents with    IUD/Intrauterine Device     replacement     Visit Vitals  Ht 5' 4\" (1.626 m)   Wt 280 lb (127 kg)   LMP  (LMP Unknown)   BMI 48.06 kg/m²

## 2023-05-04 ENCOUNTER — OFFICE VISIT (OUTPATIENT)
Dept: OBGYN CLINIC | Age: 38
End: 2023-05-04
Payer: MEDICAID

## 2023-05-04 VITALS — WEIGHT: 280 LBS | BODY MASS INDEX: 47.8 KG/M2 | HEIGHT: 64 IN

## 2023-05-04 DIAGNOSIS — N94.89 SUPPRESSION OF MENSTRUATION: Primary | ICD-10-CM

## 2023-05-04 DIAGNOSIS — Z97.5 IUD (INTRAUTERINE DEVICE) IN PLACE: ICD-10-CM

## 2023-05-04 PROBLEM — R91.1 LESION OF LUNG: Status: ACTIVE | Noted: 2023-05-04

## 2023-05-04 PROCEDURE — 99213 OFFICE O/P EST LOW 20 MIN: CPT | Performed by: OBSTETRICS & GYNECOLOGY

## 2023-11-06 ENCOUNTER — HOSPITAL ENCOUNTER (EMERGENCY)
Facility: HOSPITAL | Age: 38
Discharge: HOME | End: 2023-11-06
Attending: EMERGENCY MEDICINE
Payer: MEDICAID

## 2023-11-06 VITALS
BODY MASS INDEX: 32.49 KG/M2 | TEMPERATURE: 98.3 F | WEIGHT: 207 LBS | RESPIRATION RATE: 18 BRPM | HEART RATE: 81 BPM | HEIGHT: 67 IN | DIASTOLIC BLOOD PRESSURE: 67 MMHG | SYSTOLIC BLOOD PRESSURE: 104 MMHG | OXYGEN SATURATION: 100 %

## 2023-11-06 DIAGNOSIS — R11.2 NAUSEA VOMITING AND DIARRHEA: Primary | ICD-10-CM

## 2023-11-06 DIAGNOSIS — R19.7 NAUSEA VOMITING AND DIARRHEA: Primary | ICD-10-CM

## 2023-11-06 LAB
ALBUMIN SERPL-MCNC: 3.7 G/DL (ref 3.5–5)
ALBUMIN/GLOB SERPL: 0.9 (ref 1.1–2.2)
ALP SERPL-CCNC: 94 U/L (ref 45–117)
ALT SERPL-CCNC: 42 U/L (ref 12–78)
ANION GAP SERPL CALC-SCNC: 8 MMOL/L (ref 5–15)
AST SERPL W P-5'-P-CCNC: 24 U/L (ref 15–37)
BASOPHILS # BLD: 0 K/UL (ref 0–0.1)
BASOPHILS NFR BLD: 0 % (ref 0–1)
BILIRUB SERPL-MCNC: 0.5 MG/DL (ref 0.2–1)
BUN SERPL-MCNC: 5 MG/DL (ref 6–20)
BUN/CREAT SERPL: 8 (ref 12–20)
CA-I BLD-MCNC: 9.1 MG/DL (ref 8.5–10.1)
CHLORIDE SERPL-SCNC: 102 MMOL/L (ref 97–108)
CO2 SERPL-SCNC: 30 MMOL/L (ref 21–32)
CREAT SERPL-MCNC: 0.59 MG/DL (ref 0.55–1.02)
DIFFERENTIAL METHOD BLD: ABNORMAL
EOSINOPHIL # BLD: 0.1 K/UL (ref 0–0.4)
EOSINOPHIL NFR BLD: 1 % (ref 0–7)
ERYTHROCYTE [DISTWIDTH] IN BLOOD BY AUTOMATED COUNT: 15 % (ref 11.5–14.5)
GLOBULIN SER CALC-MCNC: 4.2 G/DL (ref 2–4)
GLUCOSE SERPL-MCNC: 81 MG/DL (ref 65–100)
HCG SERPL QL: NEGATIVE
HCT VFR BLD AUTO: 41.2 % (ref 35–47)
HGB BLD-MCNC: 13.5 G/DL (ref 11.5–16)
IMM GRANULOCYTES # BLD AUTO: 0 K/UL (ref 0–0.04)
IMM GRANULOCYTES NFR BLD AUTO: 0 % (ref 0–0.5)
LIPASE SERPL-CCNC: 17 U/L (ref 13–75)
LYMPHOCYTES # BLD: 2.3 K/UL (ref 0.8–3.5)
LYMPHOCYTES NFR BLD: 29 % (ref 12–49)
MCH RBC QN AUTO: 28.3 PG (ref 26–34)
MCHC RBC AUTO-ENTMCNC: 32.8 G/DL (ref 30–36.5)
MCV RBC AUTO: 86.4 FL (ref 80–99)
MONOCYTES # BLD: 0.6 K/UL (ref 0–1)
MONOCYTES NFR BLD: 8 % (ref 5–13)
NEUTS SEG # BLD: 4.9 K/UL (ref 1.8–8)
NEUTS SEG NFR BLD: 62 % (ref 32–75)
PLATELET # BLD AUTO: 240 K/UL (ref 150–400)
PMV BLD AUTO: 10.8 FL (ref 8.9–12.9)
POTASSIUM SERPL-SCNC: 3.2 MMOL/L (ref 3.5–5.1)
PROT SERPL-MCNC: 7.9 G/DL (ref 6.4–8.2)
RBC # BLD AUTO: 4.77 M/UL (ref 3.8–5.2)
SODIUM SERPL-SCNC: 140 MMOL/L (ref 136–145)
WBC # BLD AUTO: 7.9 K/UL (ref 3.6–11)

## 2023-11-06 PROCEDURE — 99284 EMERGENCY DEPT VISIT MOD MDM: CPT

## 2023-11-06 PROCEDURE — 6360000002 HC RX W HCPCS: Performed by: EMERGENCY MEDICINE

## 2023-11-06 PROCEDURE — 80053 COMPREHEN METABOLIC PANEL: CPT

## 2023-11-06 PROCEDURE — 96374 THER/PROPH/DIAG INJ IV PUSH: CPT

## 2023-11-06 PROCEDURE — 85025 COMPLETE CBC W/AUTO DIFF WBC: CPT

## 2023-11-06 PROCEDURE — 84703 CHORIONIC GONADOTROPIN ASSAY: CPT

## 2023-11-06 PROCEDURE — 2580000003 HC RX 258: Performed by: EMERGENCY MEDICINE

## 2023-11-06 PROCEDURE — 83690 ASSAY OF LIPASE: CPT

## 2023-11-06 PROCEDURE — 36415 COLL VENOUS BLD VENIPUNCTURE: CPT

## 2023-11-06 RX ORDER — 0.9 % SODIUM CHLORIDE 0.9 %
1000 INTRAVENOUS SOLUTION INTRAVENOUS
Status: COMPLETED | OUTPATIENT
Start: 2023-11-06 | End: 2023-11-06

## 2023-11-06 RX ORDER — ONDANSETRON 2 MG/ML
4 INJECTION INTRAMUSCULAR; INTRAVENOUS ONCE
Status: COMPLETED | OUTPATIENT
Start: 2023-11-06 | End: 2023-11-06

## 2023-11-06 RX ORDER — ONDANSETRON 4 MG/1
4 TABLET, ORALLY DISINTEGRATING ORAL EVERY 8 HOURS PRN
Qty: 20 TABLET | Refills: 0 | Status: SHIPPED | OUTPATIENT
Start: 2023-11-06

## 2023-11-06 RX ADMIN — ONDANSETRON 4 MG: 2 INJECTION INTRAMUSCULAR; INTRAVENOUS at 10:21

## 2023-11-06 RX ADMIN — SODIUM CHLORIDE 1000 ML: 9 INJECTION, SOLUTION INTRAVENOUS at 10:23

## 2023-11-06 ASSESSMENT — PAIN SCALES - GENERAL: PAINLEVEL_OUTOF10: 0

## 2023-11-06 ASSESSMENT — PAIN - FUNCTIONAL ASSESSMENT: PAIN_FUNCTIONAL_ASSESSMENT: 0-10

## 2023-11-06 NOTE — ED PROVIDER NOTES
[facility] EMERGENCY DEPT  EMERGENCY DEPARTMENT HISTORY AND PHYSICAL EXAM      Date: 2023  Patient Name: Ramo Cano  MRN: [id number]  [address] 1985  Date of evaluation: 2023  Provider: Inga Moore MD   Note Started: 10:04 AM EST 23    HISTORY OF PRESENT ILLNESS     Chief Complaint   Patient presents with    Emesis    Diarrhea       History Provided By: Patient    HPI: Ramo Cano is a 41 y.o. female presents to the emergency department complaint of nausea vomiting and diarrhea x4 days. Patient denies fevers or chills. Patient states she is worried she was dehydrated and given her prior gastric bypass earlier this year, she states she was told to come to the emergency room anytime she is vomiting. PAST MEDICAL HISTORY   Past Medical History:  Past Medical History:   Diagnosis Date    Anxiety     Depression     GERD with esophagitis     Hiatal hernia     History of abnormal cervical Pap smear 2018    Insomnia     Morbid obesity ([address])     Pre-diabetes        Past Surgical History:  Past Surgical History:   Procedure Laterality Date    COLPOSCOPY  2018    GASTRIC BYPASS SURGERY          GI      cholecystectomy    GI  2023    endoscopy    HERNIA REPAIR      WISDOM TOOTH EXTRACTION         Family History:  Family History   Problem Relation Age of Onset    Diabetes Mother     Elevated Lipids Father     Mental Retardation Brother     Hypertension Father        Social History:  Social History     Tobacco Use    Smoking status: Every Day     Packs/day: .5     Types: Cigarettes     Last attempt to quit: 10/22/2009     Years since quittin.0    Smokeless tobacco: Never   Substance Use Topics    Alcohol use: No    Drug use: Yes     Types: Marijuana [place])       Allergies:   Allergies   Allergen Reactions    Latex Rash and Swelling    Iodine Itching    Shellfish Allergy Itching    Tramadol Hives       PCP: Mirta Spear MD    Current Meds:   Current

## 2023-11-06 NOTE — ED TRIAGE NOTES
Pt c/o vomiting and diarrhea that started Thurs  Hx gastric bypass and was instructed if and whenever she thought she was dehydrated to go to and ED for IV fluids.

## 2023-11-06 NOTE — DISCHARGE INSTRUCTIONS
Thank you! Thank you for allowing me to care for you in the emergency department. It is my goal to provide you with excellent care. If you have not received excellent quality care, please ask to speak to the nurse manager. Please fill out the survey that will come to you by mail or email since we listen to your feedback! Below you will find a list of your tests from today's visit. Should you have any questions, please do not hesitate to call the emergency department.     Labs  Recent Results (from the past 12 hour(s))   HCG Qualitative, Serum    Collection Time: 11/06/23 10:15 AM   Result Value Ref Range    hCG Qual Negative Negative     CMP    Collection Time: 11/06/23 10:15 AM   Result Value Ref Range    Sodium 140 136 - 145 mmol/L    Potassium 3.2 (L) 3.5 - 5.1 mmol/L    Chloride 102 97 - 108 mmol/L    CO2 30 21 - 32 mmol/L    Anion Gap 8 5 - 15 mmol/L    Glucose 81 65 - 100 mg/dL    BUN 5 (L) 6 - 20 mg/dL    Creatinine 0.59 0.55 - 1.02 mg/dL    Bun/Cre Ratio 8 (L) 12 - 20      Est, Glom Filt Rate >60 >60 ml/min/1.73m2    Calcium 9.1 8.5 - 10.1 mg/dL    Total Bilirubin 0.5 0.2 - 1.0 mg/dL    AST 24 15 - 37 U/L    ALT 42 12 - 78 U/L    Alk Phosphatase 94 45 - 117 U/L    Total Protein 7.9 6.4 - 8.2 g/dL    Albumin 3.7 3.5 - 5.0 g/dL    Globulin 4.2 (H) 2.0 - 4.0 g/dL    Albumin/Globulin Ratio 0.9 (L) 1.1 - 2.2     CBC with Auto Differential    Collection Time: 11/06/23 10:15 AM   Result Value Ref Range    WBC 7.9 3.6 - 11.0 K/uL    RBC 4.77 3.80 - 5.20 M/uL    Hemoglobin 13.5 11.5 - 16.0 g/dL    Hematocrit 41.2 35.0 - 47.0 %    MCV 86.4 80.0 - 99.0 FL    MCH 28.3 26.0 - 34.0 PG    MCHC 32.8 30.0 - 36.5 g/dL    RDW 15.0 (H) 11.5 - 14.5 %    Platelets 784 140 - 105 K/uL    MPV 10.8 8.9 - 12.9 FL    Neutrophils % 62 32 - 75 %    Lymphocytes % 29 12 - 49 %    Monocytes % 8 5 - 13 %    Eosinophils % 1 0 - 7 %    Basophils % 0 0 - 1 %    Immature Granulocytes 0 0.0 - 0.5 %    Neutrophils Absolute 4.9 1.8 - 8.0

## 2025-07-01 ENCOUNTER — OFFICE VISIT (OUTPATIENT)
Age: 40
End: 2025-07-01
Payer: COMMERCIAL

## 2025-07-01 VITALS
HEIGHT: 67 IN | DIASTOLIC BLOOD PRESSURE: 62 MMHG | BODY MASS INDEX: 23.29 KG/M2 | SYSTOLIC BLOOD PRESSURE: 110 MMHG | WEIGHT: 148.38 LBS

## 2025-07-01 DIAGNOSIS — Z01.419 GYNECOLOGIC EXAM NORMAL: ICD-10-CM

## 2025-07-01 DIAGNOSIS — Z97.5 IUD (INTRAUTERINE DEVICE) IN PLACE: ICD-10-CM

## 2025-07-01 DIAGNOSIS — Z12.4 PAP SMEAR FOR CERVICAL CANCER SCREENING: Primary | ICD-10-CM

## 2025-07-01 DIAGNOSIS — N94.89 SUPPRESSION OF MENSTRUATION: ICD-10-CM

## 2025-07-01 PROCEDURE — 99395 PREV VISIT EST AGE 18-39: CPT | Performed by: OBSTETRICS & GYNECOLOGY

## 2025-07-01 ASSESSMENT — ENCOUNTER SYMPTOMS
RESPIRATORY NEGATIVE: 1
GASTROINTESTINAL NEGATIVE: 1

## 2025-07-01 NOTE — PROGRESS NOTES
Chief Complaint   Patient presents with    Annual Exam     /62 (BP Site: Left Upper Arm, Patient Position: Sitting, BP Cuff Size: Small Adult)   Ht 1.702 m (5' 7\")   Wt 67.3 kg (148 lb 6 oz)   BMI 23.24 kg/m²

## 2025-07-01 NOTE — PROGRESS NOTES
Linda Garcia is a , 40 y.o. female   No LMP recorded. (Menstrual status: IUD).    She presents for her annual    She is having no significant problems.      Menstrual status:  Cycles are minimal to none with hormone containing IUS.    Flow: absent.      She does not have dysmenorrhea.      Medical conditions:  Since her last annual GYN exam about one year ago, she has not the following changes in her health history: none.     Mammogram History:    HARRY Results (most recent):  @[employer](EAA7849:1)@     DEXA Results (most recent):  @[employer](AUL6438:1)@       Past Medical History:   Diagnosis Date    Anxiety     Depression     GERD with esophagitis     Hiatal hernia     History of abnormal cervical Pap smear     Insomnia     Morbid obesity ([facility])     Pre-diabetes      Past Surgical History:   Procedure Laterality Date    COLPOSCOPY      GASTRIC BYPASS SURGERY          GI      cholecystectomy    GI  2023    endoscopy    HERNIA REPAIR      WISDOM TOOTH EXTRACTION         Prior to Admission medications    Medication Sig Start Date End Date Taking? Authorizing Provider   Ascorbic Acid 500 MG CHEW ceived the following from [facility]: Outside name: ascorbic acid (VITAMIN C) 500 mg chewable tablet 23  Yes Automatic Reconciliation, Ar   vitamin D (CHOLECALCIFEROL) 83423 UNIT CAPS ceived the following from [facility]: Outside name: cholecalciferol (VITAMIN D3) (50,000 UNITS /1250 MCG) capsule 23  Yes Automatic Reconciliation, Ar   cyanocobalamin 1000 MCG tablet ceived the following from [facility]: Outside name: cyanocobalamin 1,000 mcg tablet 3/1/23  Yes Automatic Reconciliation, Ar   omeprazole (PRILOSEC) 20 MG delayed release capsule ceived the following from [facility]: Outside name: omeprazole (PRILOSEC) 20 mg capsule 23  Yes Automatic Reconciliation, Ar       Allergies   Allergen Reactions

## 2025-07-02 LAB
., LABCORP: NORMAL
CYTOLOGIST CVX/VAG CYTO: NORMAL
CYTOLOGY CVX/VAG DOC CYTO: NORMAL
CYTOLOGY CVX/VAG DOC THIN PREP: NORMAL
DX ICD CODE: NORMAL
OTHER STN SPEC: NORMAL
SERVICE CMNT-IMP: NORMAL
STAT OF ADQ CVX/VAG CYTO-IMP: NORMAL

## 2025-07-03 ENCOUNTER — RESULTS FOLLOW-UP (OUTPATIENT)
Age: 40
End: 2025-07-03